# Patient Record
(demographics unavailable — no encounter records)

---

## 2025-07-10 NOTE — ASSESSMENT
[FreeTextEntry1] : Ms. Lux is a 99 year old woman with a new left breast lump. The differential diagnosis ranges from a hematoma to a breast cancer. A mammogram and left limited US are ordered for a workup. Should she need a needle biopsy she can check with her Cardiologist if she can or cannot hold the plavix for the procedure. I will call her with the imaging results to determine the next step in management. She understands and is comfortable with the plan. She is encouraged to call if any questions or concerns arise.   Addendum - Ms. Lux's blood pressure was high in the office as she was nervous. The plan was for her to recheck it once she went home and call the Cardiologist if still elevated. When she arrived home, I spoke with her on the phone and the blood pressure was 110/60's, which was fine.

## 2025-07-10 NOTE — PHYSICAL EXAM
[Normocephalic] : normocephalic [Atraumatic] : atraumatic [Sclera nonicteric] : sclera nonicteric [Supple] : supple [No Supraclavicular Adenopathy] : no supraclavicular adenopathy [No Cervical Adenopathy] : no cervical adenopathy [Clear to Auscultation Bilat] : clear to auscultation bilaterally [Normal Sinus Rhythm] : normal sinus rhythm [Examined in the supine and seated position] : examined in the supine and seated position [Bra Size: ___] : Bra Size: [unfilled] [No dominant masses] : no dominant masses in right breast  [No Nipple Retraction] : no left nipple retraction [No Nipple Discharge] : no left nipple discharge [No Axillary Lymphadenopathy] : no left axillary lymphadenopathy [No Edema] : no edema [No Rashes] : no rashes [No Ulceration] : no ulceration [de-identified] : 1.5 cm lump at 12:00. Ecchymosis in surrounding region

## 2025-07-10 NOTE — PHYSICAL EXAM
[Normocephalic] : normocephalic [Atraumatic] : atraumatic [Sclera nonicteric] : sclera nonicteric [Supple] : supple [No Supraclavicular Adenopathy] : no supraclavicular adenopathy [No Cervical Adenopathy] : no cervical adenopathy [Clear to Auscultation Bilat] : clear to auscultation bilaterally [Normal Sinus Rhythm] : normal sinus rhythm [Examined in the supine and seated position] : examined in the supine and seated position [Bra Size: ___] : Bra Size: [unfilled] [No dominant masses] : no dominant masses in right breast  [No Nipple Retraction] : no left nipple retraction [No Nipple Discharge] : no left nipple discharge [No Axillary Lymphadenopathy] : no left axillary lymphadenopathy [No Edema] : no edema [No Rashes] : no rashes [No Ulceration] : no ulceration [de-identified] : 1.5 cm lump at 12:00. Ecchymosis in surrounding region

## 2025-07-10 NOTE — HISTORY OF PRESENT ILLNESS
[FreeTextEntry1] : Ms. Lux is a 99 year old woman who presents for a consultation for a left breast lump. Around late May she fell on her chest against a fridge. The area was sore and when she rubbed on the bruise, she noticed a larger lump in the left breast with a smaller lump next to it. Prior to the fall she did not feel any lumps. No nipple discharge or skin changes. She does not feel the smaller lump any more and the larger lump may have decreased a little. Her last mammogram was over 20 years ago. She is nervous today.  Her family history is not significant for any breast cancer.